# Patient Record
Sex: MALE | Race: WHITE
[De-identification: names, ages, dates, MRNs, and addresses within clinical notes are randomized per-mention and may not be internally consistent; named-entity substitution may affect disease eponyms.]

---

## 2018-01-29 ENCOUNTER — HOSPITAL ENCOUNTER (EMERGENCY)
Dept: HOSPITAL 11 - JP.ED | Age: 23
Discharge: HOME | End: 2018-01-29
Payer: COMMERCIAL

## 2018-01-29 VITALS — SYSTOLIC BLOOD PRESSURE: 112 MMHG | DIASTOLIC BLOOD PRESSURE: 73 MMHG

## 2018-01-29 DIAGNOSIS — B86: Primary | ICD-10-CM

## 2018-01-29 NOTE — EDM.PDOC
ED HPI GENERAL MEDICAL PROBLEM





- General


Chief Complaint: Skin Complaint


Stated Complaint: ITCHY RASH ALL OVER


Time Seen by Provider: 01/29/18 01:27


Source of Information: Reports: Patient


History Limitations: Reports: No Limitations





- History of Present Illness


INITIAL COMMENTS - FREE TEXT/NARRATIVE: 





pt has a rash which covers his entire body. He states he has been waking up 

itching. The nites are by far the worse. 


Onset: Gradual


Duration: Day(s):


Location: Reports: Generalized


Associated Symptoms: Reports: Rash


  ** denies pain


Pain Score (Numeric/FACES): 0





- Related Data


 Allergies











Allergy/AdvReac Type Severity Reaction Status Date / Time


 


No Known Allergies Allergy   Verified 01/29/18 01:02











Home Meds: 


 Home Meds





NK [No Known Home Meds]  07/13/14 [History]











Past Medical History





- Past Health History


Medical/Surgical History: Denies Medical/Surgical History


HEENT History: Reports: Impaired Vision





Social & Family History





- Tobacco Use


Smoking Status *Q: Never Smoker


Second Hand Smoke Exposure: No





- Caffeine Use


Caffeine Use: Reports: Energy Drinks





- Recreational Drug Use


Recreational Drug Use: No





ED ROS GENERAL





- Review of Systems


Review Of Systems: See Below


Constitutional: Reports: No Symptoms


HEENT: Reports: No Symptoms


Respiratory: Reports: No Symptoms


Cardiovascular: Reports: No Symptoms


Endocrine: Reports: No Symptoms


GI/Abdominal: Reports: No Symptoms


: Reports: No Symptoms


Skin: Reports: Rash





ED EXAM, SKIN/RASH


Exam: See Below


Text/Narrative:: 





 rash over his entire body except on his face


Exam Limited By: No Limitations


General Appearance: Alert, Anxious


Ears: Normal TMs


Nose: Normal Inspection


Throat/Mouth: Normal Inspection


Head: Atraumatic


Neck: Normal Inspection


Skin: Rash, Other (Has the look of scabies. )





Course





- Vital Signs


Last Recorded V/S: 


 Last Vital Signs











Temp  35.5 C   01/29/18 01:08


 


Pulse  60   01/29/18 01:08


 


Resp  16   01/29/18 01:08


 


BP  112/73   01/29/18 01:08


 


Pulse Ox  100   01/29/18 01:08














Departure





- Departure


Time of Disposition: 01:27


Disposition: Home, Self-Care 01


Condition: Fair


Clinical Impression: 


 Scabies








- Discharge Information


Referrals: 


Michael Mcknight MD [Primary Care Provider] - 


Forms:  ED Department Discharge


Care Plan Goals: 


wash all bedding and treat with permethrin lotion 5 % leave on for 6-7 hours 

then shower it off, retreat in 1 week.  Use benadryl 50mg q6h prn for itching.

## 2019-09-02 NOTE — EDM.PDOC
ED HPI GENERAL MEDICAL PROBLEM





- General


Chief Complaint: Eye Problems


Stated Complaint: R EYE PAIN


Time Seen by Provider: 09/02/19 18:00


Source of Information: Reports: Patient


History Limitations: Reports: No Limitations





- History of Present Illness


INITIAL COMMENTS - FREE TEXT/NARRATIVE: 


25 yo male presents with right eye irritation after grinding a electrical wire 

to rewire a trailer.  Patient was wearing his glasses but not lateral eye 

shields.  Patient irritated in right eye without improvement with a small 

amount of fluid then laid down a put a whole bottle of water which improved his 

symptoms. Patient has had a slight runny nose due to eye watering.  Patient has 

had corneal abrasions in the past.  He wears glasses normally.  Patient denies 

photophobia or pain just irritation noted.  








- Related Data


 Allergies











Allergy/AdvReac Type Severity Reaction Status Date / Time


 


No Known Allergies Allergy   Verified 09/02/19 18:14











Home Meds: 


 Home Meds





NK [No Known Home Meds]  07/13/14 [History]











Past Medical History





- Past Health History


Medical/Surgical History: Denies Medical/Surgical History


HEENT History: Reports: Impaired Vision





Social & Family History





- Caffeine Use


Caffeine Use: Reports: Energy Drinks





ED ROS GENERAL





- Review of Systems


Review Of Systems: See Below





ED EXAM GENERAL W FULL EYE





- Physical Exam


Exam: See Below


Exam Limited By: No Limitations


General Appearance: Alert, WD/WN, Mild Distress


Eye Exam: Right Eye: Corneal Abrasion (noted with flourecein and blue lamp ), 

Other (slight injection noted), Bilateral Eye: EOMI, PERRL


Visual Acuity (R) 20/: 20


Visual Acuity (L) 20/: 20


With Correction: Yes


Eyelids: Bilateral: Normal Appearance


Conjunctiva & Sclera: Right: Injected, Left: Normal Appearance


Cornea Exam: Right: Corneal Abrasion (at 6 o clock inferior margin of cornea), 

Left: Normal Appearance


Extraocular Movements: Bilateral: Intact


Pupils: Normal Accommodation


Pupillary Reaction: Bilateral: Brisk


Anterior Chamber: Bilateral: Normal Appearance


Ears: Normal External Exam, Hearing Grossly Normal


Nose: Normal Inspection, Normal Mucosa, No Blood


Throat/Mouth: Normal Inspection, Normal Lips, Normal Teeth, Normal Gums, Normal 

Oropharynx, Normal Voice, No Airway Compromise


Head: Normocephalic


Neck: Normal Inspection, Full Range of Motion


Respiratory/Chest: No Respiratory Distress


Cardiovascular: Normal Peripheral Pulses, Regular Rate, Rhythm


Neurological: Alert, Oriented, CN II-XII Intact, Normal Cognition, Normal Gait, 

Normal Reflexes, No Motor/Sensory Deficits


Psychiatric: Normal Affect, Normal Mood


Skin Exam: Warm, Dry, Intact, Normal Color, No Rash





Course





- Vital Signs


Last Recorded V/S: 


 Last Vital Signs











Temp  36.3 C   09/02/19 17:18


 


Pulse  55 L  09/02/19 17:18


 


Resp  16   09/02/19 17:18


 


BP  109/69   09/02/19 17:18


 


Pulse Ox  100   09/02/19 17:18














- Orders/Labs/Meds


Orders: 


 Active Orders 24 hr











 Category Date Time Status


 


 Vaccines to be Administered [RC] PER UNIT ROUTINE Care  09/02/19 18:13 Ordered


 


 Visual Acuity [Vision Test] [RC] ASDIRECTED Care  09/02/19 17:58 Active











Meds: 


Medications














Discontinued Medications














Generic Name Dose Route Start Last Admin





  Trade Name Freq  PRN Reason Stop Dose Admin


 


Diphtheria/Tetanus/Acell Pertussis  0.5 ml  09/02/19 18:12  





  Adacel  IM  09/02/19 18:13  





  .ONCE ONE   





     





     





     





     














Departure





- Departure


Time of Disposition: 18:23


Disposition: Home, Self-Care 01


Clinical Impression: 


 Corneal abrasion, Tetanus toxoid vaccination administered at current visit








- Discharge Information


Instructions:  Corneal Abrasion


Referrals: 


PCP,None [Primary Care Provider] - 


Forms:  ED Department Discharge


Additional Instructions: 


1.  Erythromycin ointment every 4-6 hrs while awake x 7 days to prevent 

infection due to abrasion. 


2.  Tetanus updated during ER visit. 


3.  See Eye Doctor in 2-3 days if not improving sooner if increased pain or 

photophobia. 


4.  Return to ER if worsening symptoms or new concerns. 


5.  Increase fluid intake. 








- Problem List & Annotations


(1) Corneal abrasion


SNOMED Code(s): 50297812


   Code(s): S05.00XA - INJ CONJUNCTIVA AND CORNEAL ABRASION W/O FB, UNSP EYE, 

INIT   Status: Acute   Current Visit: Yes   





(2) Tetanus toxoid vaccination administered at current visit


SNOMED Code(s): 587030597, 764529107


   Code(s): Z23 - ENCOUNTER FOR IMMUNIZATION   Status: Acute   Current Visit: 

Yes   





- My Orders


Last 24 Hours: 


My Active Orders





09/02/19 17:58


Visual Acuity [Vision Test] [RC] ASDIRECTED 





09/02/19 18:13


Vaccines to be Administered [RC] PER UNIT ROUTINE 














- Assessment/Plan


Last 24 Hours: 


My Active Orders





09/02/19 17:58


Visual Acuity [Vision Test] [RC] ASDIRECTED 





09/02/19 18:13


Vaccines to be Administered [RC] PER UNIT ROUTINE